# Patient Record
Sex: FEMALE | Race: WHITE | NOT HISPANIC OR LATINO | Employment: FULL TIME | URBAN - METROPOLITAN AREA
[De-identification: names, ages, dates, MRNs, and addresses within clinical notes are randomized per-mention and may not be internally consistent; named-entity substitution may affect disease eponyms.]

---

## 2018-04-12 ENCOUNTER — OFFICE VISIT (OUTPATIENT)
Dept: URGENT CARE | Facility: MEDICAL CENTER | Age: 68
End: 2018-04-12
Payer: MEDICARE

## 2018-04-12 VITALS
RESPIRATION RATE: 20 BRPM | HEART RATE: 132 BPM | DIASTOLIC BLOOD PRESSURE: 100 MMHG | TEMPERATURE: 98.8 F | HEIGHT: 65 IN | WEIGHT: 169 LBS | SYSTOLIC BLOOD PRESSURE: 160 MMHG | BODY MASS INDEX: 28.16 KG/M2 | OXYGEN SATURATION: 96 %

## 2018-04-12 DIAGNOSIS — J01.00 ACUTE MAXILLARY SINUSITIS, RECURRENCE NOT SPECIFIED: Primary | ICD-10-CM

## 2018-04-12 PROCEDURE — 99203 OFFICE O/P NEW LOW 30 MIN: CPT | Performed by: FAMILY MEDICINE

## 2018-04-12 PROCEDURE — G0463 HOSPITAL OUTPT CLINIC VISIT: HCPCS | Performed by: FAMILY MEDICINE

## 2018-04-12 RX ORDER — HYDROCODONE BITARTRATE AND ACETAMINOPHEN 5; 325 MG/1; MG/1
1 TABLET ORAL EVERY 6 HOURS PRN
COMMUNITY

## 2018-04-12 RX ORDER — AMOXICILLIN 500 MG/1
500 TABLET, FILM COATED ORAL 3 TIMES DAILY
Qty: 30 TABLET | Refills: 0 | Status: SHIPPED | OUTPATIENT
Start: 2018-04-12 | End: 2018-04-22

## 2018-04-12 RX ORDER — ATORVASTATIN CALCIUM 10 MG/1
10 TABLET, FILM COATED ORAL DAILY
COMMUNITY

## 2018-04-12 RX ORDER — HYDROCHLOROTHIAZIDE 12.5 MG/1
12.5 TABLET ORAL DAILY
COMMUNITY

## 2018-04-12 RX ORDER — MELOXICAM 7.5 MG/1
15 TABLET ORAL DAILY
COMMUNITY

## 2018-04-12 RX ORDER — ALPRAZOLAM 0.25 MG/1
TABLET ORAL
COMMUNITY

## 2018-04-12 NOTE — PROGRESS NOTES
[facility]        NAME: Momo Rivas is a 79 y o  female  : 1950    MRN: [id number]  DATE: 2018  TIME: 12:17 PM    Assessment and Plan   Acute maxillary sinusitis, recurrence not specified [J01 00]  1  Acute maxillary sinusitis, recurrence not specified           Patient Instructions       Follow up with PCP in 3-5 days  Proceed to  ER if symptoms worsen  Chief Complaint     Chief Complaint   Patient presents with    Cold Like Symptoms     x 1week   History of Present Illness       The patient is a 53-year-old female presents with a 7 day history of increasing nasal discharge, congestion and headache  She has had no fever, chills or body aches since the onset of her symptoms  Patient states her nasal discharge has been colored over the past several days, she has had no nausea or vomiting  Review of Systems   Review of Systems   Constitutional: Negative  HENT: Positive for congestion, rhinorrhea, sinus pain and sinus pressure  Respiratory: Positive for cough  Negative for chest tightness  Gastrointestinal: Negative            Current Medications       Current Outpatient Prescriptions:     ALPRAZolam (XANAX) 0 25 mg tablet, Take by mouth daily at bedtime as needed for anxiety, Disp: , Rfl:     atorvastatin (LIPITOR) 10 mg tablet, Take 10 mg by mouth daily, Disp: , Rfl:     hydrochlorothiazide (HYDRODIURIL) 12 5 mg tablet, Take 12 5 mg by mouth daily, Disp: , Rfl:     HYDROcodone-acetaminophen (NORCO) 5-325 mg per tablet, Take 1 tablet by mouth every 6 (six) hours as needed for pain, Disp: , Rfl:     meloxicam (MOBIC) 7 5 mg tablet, Take 15 mg by mouth daily, Disp: , Rfl:     Current Allergies     Allergies as of 2018 - never reviewed   Allergen Reaction Noted    Sulfa antibiotics Hives 2018            The following portions of the patient's history were reviewed and updated as appropriate: allergies, current medications, past family history, past medical history, past social history, past surgical history and problem list      Past Medical History:   Diagnosis Date    Anxiety     High cholesterol     Hypertension        Past Surgical History:   Procedure Laterality Date    CHOLECYSTECTOMY      REPLACEMENT TOTAL KNEE Left     TONSILLECTOMY         History reviewed  No pertinent family history  Medications have been verified  Objective   /100   Pulse (!) 132   Temp 98 8 °F (37 1 °C) (Temporal)   Resp 20   Ht 5' 5" (1 651 m)   Wt 76 7 kg (169 lb)   SpO2 96%   BMI 28 12 kg/m²        Physical Exam     Physical Exam   Constitutional: She appears well-developed and well-nourished  No distress  HENT:   Head: Normocephalic and atraumatic  Right Ear: Tympanic membrane and external ear normal    Left Ear: Tympanic membrane and external ear normal    Nose: Mucosal edema and rhinorrhea present  Right sinus exhibits maxillary sinus tenderness  Left sinus exhibits maxillary sinus tenderness  Mouth/Throat: Uvula is midline, oropharynx is clear and moist and mucous membranes are normal    Cardiovascular: Normal rate, regular rhythm and normal heart sounds  No murmur heard    Pulmonary/Chest: Effort normal and breath sounds normal

## 2018-04-12 NOTE — PROGRESS NOTES
Cold symptoms for one week having greenish drainage  Started with dry cough yesterday  Also c/o left knee pain, had total knee replacement 6 months ago   Has been taking mucinex D otc

## 2018-04-12 NOTE — PATIENT INSTRUCTIONS
1  Take Amox 500mg  1 tablet 3x daily x 10 days  2  Push fluids  3   Follow-up with PCP if symptoms persist